# Patient Record
Sex: MALE | Race: WHITE
[De-identification: names, ages, dates, MRNs, and addresses within clinical notes are randomized per-mention and may not be internally consistent; named-entity substitution may affect disease eponyms.]

---

## 2017-11-16 NOTE — OR
DATE OF PROCEDURE:  11/16/2017

 

PROCEDURES:

1. Radiofrequency ablation of left greater saphenous vein.

2. Radiofrequency ablation of right greater saphenous vein.

3. Radiofrequency ablation of right anterior accessory vein.

4. Sclerotherapy of left leg, multiple.

5. Sclerotherapy of right leg, multiple.

6. Compression wrapping, right leg, two-stage (20021).

7. Compression wrapping, left leg, two-stage (76548).

 

COMPLICATIONS:  None.

 

ASSISTANT:  None.

 

ANESTHESIA:  MAC.

 

PREOPERATIVE DIAGNOSIS:  Venous/varicose vein insufficiency with inflammation and pain.

 

POSTOPERATIVE DIAGNOSIS:  Venous/varicose vein insufficiency with inflammation and pain.

 

RISKS:  Risks, benefits, alternatives, and limitations including, but not limited to

infection, bleeding, and DVT formation were explained to the patient, and he wished to

proceed.

 

PROCEDURE IN DETAIL:  The patient was placed in supine position.  The left greater saphenous

vein was accessed around the level of the mid calf using a 21-gauge needle.  This was then

exchanged for a 35,000th wire.  This was then exchanged for a 7-Maltese sheath.

 

The RFA probe was advanced to greater than 3 cm from the saphenofemoral junction.

 

Tumescent fluid was injected in 1 cm jacket around this, and this would be verified a second

and a third time.  Direct even pressure was deployed.  The probe was deployed proximally and

distally x2 and x1 in all other segments.  The sheath and device were then removed.

 

In the same manner, same fashion and same technique, the same sequence was then performed

for the right greater saphenous vein.

 

In the same manner, same fashion and same technique, using the same equipment and same

sequence was then also performed to the right anterior accessory vein, although this was

accessed at around the level of the knee.

 

Sclerotherapy was then performed using 0.33% sodium tetradactyl and was always drawn back to

ensure intravascular injection only.  No more than 2 mL was injected in one location.  Six

on the right and six on the left.  This was always drawn back to ensure intravascular

injection only.

 

Two-stage CoFlex wrapping in figure-of-eight fashion was performed on the left and right

legs.  This was done proximal to distal.  This was to create the proximal and distal

gradient.  The patient tolerated the procedure well.

 

 

 

 

Sarath Mccormack MD

DD:  11/16/2017 10:44:02

DT:  11/16/2017 11:13:00

Job #:  [id number]

## 2019-10-16 ENCOUNTER — HOSPITAL ENCOUNTER (EMERGENCY)
Dept: HOSPITAL 11 - JP.ED | Age: 64
Discharge: HOME | End: 2019-10-16
Payer: MEDICAID

## 2019-10-16 VITALS — SYSTOLIC BLOOD PRESSURE: 144 MMHG | DIASTOLIC BLOOD PRESSURE: 75 MMHG | HEART RATE: 82 BPM
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
## 2019-10-16 DIAGNOSIS — I48.91: ICD-10-CM

## 2019-10-16 DIAGNOSIS — E11.9: ICD-10-CM

## 2019-10-16 DIAGNOSIS — E66.9: ICD-10-CM

## 2019-10-16 DIAGNOSIS — I50.9: ICD-10-CM

## 2019-10-16 DIAGNOSIS — T26.02XA: ICD-10-CM

## 2019-10-16 DIAGNOSIS — T15.12XA: Primary | ICD-10-CM
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
## 2020-09-15 ENCOUNTER — HOSPITAL ENCOUNTER (OUTPATIENT)
Dept: HOSPITAL 11 - JP.SDS | Age: 65
LOS: 1 days | Discharge: HOME | End: 2020-09-16
Attending: SURGERY
Payer: MEDICARE

## 2020-09-15 DIAGNOSIS — I48.0: ICD-10-CM

## 2020-09-15 DIAGNOSIS — I50.22: ICD-10-CM

## 2020-09-15 DIAGNOSIS — I42.9: ICD-10-CM

## 2020-09-15 DIAGNOSIS — M17.0: ICD-10-CM

## 2020-09-15 DIAGNOSIS — G57.81: ICD-10-CM

## 2020-09-15 DIAGNOSIS — E11.22: ICD-10-CM

## 2020-09-15 DIAGNOSIS — M54.5: ICD-10-CM

## 2020-09-15 DIAGNOSIS — I87.2: ICD-10-CM

## 2020-09-15 DIAGNOSIS — N18.9: ICD-10-CM

## 2020-09-15 DIAGNOSIS — I25.10: ICD-10-CM

## 2020-09-15 DIAGNOSIS — K40.30: Primary | ICD-10-CM

## 2020-09-15 DIAGNOSIS — E78.5: ICD-10-CM

## 2020-09-15 DIAGNOSIS — G89.29: ICD-10-CM

## 2020-09-15 PROCEDURE — C1781 MESH (IMPLANTABLE): HCPCS

## 2020-09-15 PROCEDURE — 82962 GLUCOSE BLOOD TEST: CPT

## 2020-09-15 PROCEDURE — 64772 INCISION OF SPINAL NERVE: CPT

## 2020-09-15 PROCEDURE — C1713 ANCHOR/SCREW BN/BN,TIS/BN: HCPCS

## 2020-09-15 PROCEDURE — 49507 PRP I/HERN INIT BLOCK >5 YR: CPT

## 2020-09-15 PROCEDURE — 94762 N-INVAS EAR/PLS OXIMTRY CONT: CPT

## 2020-09-15 RX ADMIN — INSULIN LISPRO PRN UNITS: 100 INJECTION, SOLUTION INTRAVENOUS; SUBCUTANEOUS at 16:52

## 2020-09-15 RX ADMIN — INSULIN LISPRO PRN UNITS: 100 INJECTION, SOLUTION INTRAVENOUS; SUBCUTANEOUS at 22:24

## 2020-09-15 RX ADMIN — INSULIN LISPRO PRN UNITS: 100 INJECTION, SOLUTION INTRAVENOUS; SUBCUTANEOUS at 13:24

## 2020-09-16 VITALS — DIASTOLIC BLOOD PRESSURE: 65 MMHG | SYSTOLIC BLOOD PRESSURE: 125 MMHG | HEART RATE: 74 BPM

## 2020-09-16 NOTE — DISCH
ADMISSION DIAGNOSES:  Right inguinal hernia, paroxysmal atrial fibrillation, cardiomyopathy,

primary arthritis of both knees, controlled type 2 diabetes mellitus with chronic kidney

disease, chronic systolic congestive heart failure, venous insufficiency of both

extremities, coronary artery disease, chronic bilateral low back pain, hyperlipidemia.

 

DISCHARGE DIAGNOSES:

1. Right inguinal exploration:

    a.     Repair of incarcerated indirect right inguinal hernia, and right ilioinguinal

     nerve and ileal hernia nerve at risk for scar entrapment.

    b.     Diversion and excision of portion of ilioinguinal nerve and ileal hernia nerve.

 

POSTOPERATIVE DIAGNOSIS:  Incarcerated indirect right inguinal hernia.  Date of procedure:

09/15/2020.  Surgeon:  Quinn Lewis MD.

 

HISTORY:  Davin Majano is a pleasant 65-year-old male with incarcerated inguinal hernia.

After preoperative evaluation and discussion of possible risks and possible complications,

he wished to proceed with surgical procedure.

 

HOSPITAL COURSE:  Davin had his surgery on 09/15/2020.  He had no operative complications.

On postoperative day #1, he was able to be discharged to home without any complications.

 

PHYSICAL EXAMINATION:

GENERAL:  Davin Majano is a 65-year-old male.

VITAL SIGNS:  Height is 5 feet 11 inches, weight is 224 pounds, BMI is 31.  TPR at 0500, 96;

62; 16; blood pressure 130/67.

HEENT:  Negative.

NECK:  Supple.

HEART:  Regular rate and rhythm.

LUNGS:  Clear.

ABDOMEN:  Negative.  Right inguinal dressing is on and dry and intact.

EXTREMITIES:  Without peripheral edema.

 

DISPOSITION:  Discharged to home.

 

CONDITION:  Stable and improving.

 

FOLLOWUP APPOINTMENT:  Virginia Triana PA-C, on 09/28/2020 at 9:15 a.m.

 

HOME MEDICATIONS:

1. Colace 100 mg oral b.i.d., #60.

2. Dilaudid 2 mg oral q.4 hours p.r.n. pain, #42.

3. Milk of magnesia 30 mL, 2 doses were sent home with the patient, to take 1 daily if

    needed for constipation.

4. Tylenol Extra-Strength 1000 mg every 6 hours p.r.n. pain.

5. He is to resume his home medication and supplements.

 

DIET:  Usual diabetic diet as tolerated.  Drink 8 to 10 glasses of water a day.

 

ACTIVITY:  No lifting over 10 pounds for 6 weeks.  Driving:  Do not drive for 1 week and

while on pain medication.

Shower/bathing:  May shower.

 

DISCHARGE INSTRUCTIONS:  Notify provider if any fever, increased pain, swelling, redness,

drainage, nausea, vomiting.  Keep site clean and dry.  Wear athletic supporter as directed.

Use incentive spirometer 10 times every hour while awake for 1 week.

## 2020-09-20 NOTE — OR
DATE OF PROCEDURE:  09/15/2020

 

SURGEON:  Quinn Lewis MD

 

PREOPERATIVE DIAGNOSIS:  Incarcerated right inguinal hernia.

 

POSTOPERATIVE DIAGNOSES:

1. Incarcerated indirect right inguinal hernia.

2. Right ilioinguinal and iliohypogastric nerves at risk for scar entrapment.

 

OPERATIVE PROCEDURE:  Right inguinal exploration with:

1. Repair of incarcerated right inguinal hernia with mesh plug technique (31196).

2. Excision of a portion of right ilioinguinal nerve (55529).

3. Excision of portion of right iliohypogastric nerve (63650).

 

ANESTHESIA:  General.

 

ASSISTANT:  Virginia Triana PA-C.

 

INDICATIONS FOR PROCEDURE:  A 65-year-old presenting with a large not clearly reducible

inguinal hernia on the right side.  Plan is to proceed with an open inguinal hernia repair

with mesh plug technique.  Given the extent of the hernia __________ will be planned and

with this in addition to his underlying medical comorbidities, he will be kept overnight 1

night. Potential risks of the procedure including bleeding, infection, injury to underlying

viscera, problems with mesh becoming infected or the hernia recurring, chronic pain after

the hernia repair as well as the remote possibility of cardiopulmonary, septic, or

hemorrhagic complications leading to death were all discussed, and the patient wishes to

proceed.

 

DETAILS OF PROCEDURE:  The patient was taken to the operating room.  After general

endotracheal anesthesia was induced, the abdomen and groin areas were prepped and draped.  A

standard right inguinal incision was made and carried down through the skin and subcutaneous

tissue and through the external oblique aponeurosis. Subaponeurotic flaps were then raised

superiorly and inferiorly and the cord structures were then mobilized upward.  The patient

was noted to have a large sac extending from the internal ring downward into the upper

aspect of the scrotum.  This contained some palpable bowel which once the cord structures

were mobilized upward was able to be reduced.  The cremasteric fibers were then divided and

the sac was then dissected back down to the level of the internal ring.  This was a more or

less pure indirect hernia with the floor medially being still substantially intact.  At this

point, the area was assessed and an extra large mesh plug was then placed into the internal

ring.  This was affixed medially to the Leonid's ligament with titanium tacking screws to

the underside of the conjoint tendon medially, superiorly, and then laterally with

horizontal mattress sutures of 0 Vicryl stitch.  Inferiorly, 1 additional stitch was placed

between the mesh and the shelving portion of the inguinal ligament lateral to the cord

structures.  At this point, the repair appeared to be satisfactory.  The conjoint tendon

somewhat lateral to the cord structures in this case was then affixed to the shelving

portion of the inguinal ligament with a 0 Vicryl stitch displacing the cord structures

somewhat medially.

 

Both the ilioinguinal and iliohypogastric nerves were going to be directly under the flat

portion of the mesh plug system and these were then divided in the midportion of the field

and then dissected to the far lateral aspect of the incision where they were once again

divided so as to minimize chances of neuropathic postoperative pain.

 

The flat portion of mesh plug system was then fashioned such that it encircled the cord

structures.  It was affixed medially to the pubic tubercle with titanium tacking screw and

then cut such that there was regular coverage of the lateral aspect of the inguinal floor

outside of the cord structures and sutured with some 3-0 Vicryl stitch.  The external

oblique aponeurosis was then approximated with some 3-0 Vicryl stitch as was Brissa's fascia

and the skin closed with 4-0 Vicryl subcuticular stitch.  Prior to closure, the lesion was

anesthetized with 1% lidocaine mixed with Marcaine and the patient taken to the recovery

room in satisfactory condition.

 

Physician assistant, Virginia Triana, played an essential role in assisting in this case,

helping to position the patient, retract structures as needed, as well as suturing and

cutting sutures when indicated.  Her presence improved patient safety and decreased the

operative time.

 

 

 

 

Quinn Lewis MD

DD:  09/19/2020 17:17:44

DT:  09/20/2020 10:23:54

Job #:  [id number]

## 2025-05-19 ENCOUNTER — HOSPITAL ENCOUNTER (EMERGENCY)
Dept: HOSPITAL 11 - JP.ED | Age: 70
Discharge: HOME | End: 2025-05-19
Payer: MEDICARE

## 2025-05-19 VITALS — HEART RATE: 63 BPM | DIASTOLIC BLOOD PRESSURE: 78 MMHG | SYSTOLIC BLOOD PRESSURE: 183 MMHG

## 2025-05-19 DIAGNOSIS — Z79.4: ICD-10-CM

## 2025-05-19 DIAGNOSIS — I50.9: ICD-10-CM

## 2025-05-19 DIAGNOSIS — J01.00: Primary | ICD-10-CM

## 2025-05-19 DIAGNOSIS — I48.91: ICD-10-CM

## 2025-05-19 DIAGNOSIS — I13.0: ICD-10-CM

## 2025-05-19 DIAGNOSIS — E78.00: ICD-10-CM

## 2025-05-19 DIAGNOSIS — N18.9: ICD-10-CM

## 2025-05-19 DIAGNOSIS — I25.10: ICD-10-CM

## 2025-05-19 DIAGNOSIS — E11.22: ICD-10-CM

## 2025-05-19 DIAGNOSIS — E66.9: ICD-10-CM

## 2025-05-19 DIAGNOSIS — Z79.84: ICD-10-CM

## 2025-05-19 DIAGNOSIS — Z79.899: ICD-10-CM

## 2025-05-19 PROCEDURE — 96372 THER/PROPH/DIAG INJ SC/IM: CPT

## 2025-05-19 PROCEDURE — 99284 EMERGENCY DEPT VISIT MOD MDM: CPT

## 2025-05-19 PROCEDURE — 70450 CT HEAD/BRAIN W/O DYE: CPT

## 2025-05-19 RX ADMIN — SUMATRIPTAN ONE MG: 6 INJECTION, SOLUTION SUBCUTANEOUS at 12:20

## 2025-06-12 ENCOUNTER — HOSPITAL ENCOUNTER (INPATIENT)
Dept: HOSPITAL 11 - JP.ED | Age: 70
LOS: 2 days | Discharge: HOME HEALTH | DRG: 637 | End: 2025-06-14
Attending: INTERNAL MEDICINE | Admitting: INTERNAL MEDICINE
Payer: MEDICARE

## 2025-06-12 DIAGNOSIS — N18.9: ICD-10-CM

## 2025-06-12 DIAGNOSIS — E66.9: ICD-10-CM

## 2025-06-12 DIAGNOSIS — I25.10: ICD-10-CM

## 2025-06-12 DIAGNOSIS — E11.22: ICD-10-CM

## 2025-06-12 DIAGNOSIS — Z79.4: ICD-10-CM

## 2025-06-12 DIAGNOSIS — E11.10: ICD-10-CM

## 2025-06-12 DIAGNOSIS — E11.11: Primary | ICD-10-CM

## 2025-06-12 DIAGNOSIS — I42.9: ICD-10-CM

## 2025-06-12 DIAGNOSIS — G93.41: ICD-10-CM

## 2025-06-12 DIAGNOSIS — I13.0: ICD-10-CM

## 2025-06-12 DIAGNOSIS — H54.7: ICD-10-CM

## 2025-06-12 DIAGNOSIS — Z79.899: ICD-10-CM

## 2025-06-12 DIAGNOSIS — I48.91: ICD-10-CM

## 2025-06-12 DIAGNOSIS — Z90.49: ICD-10-CM

## 2025-06-12 DIAGNOSIS — N17.9: ICD-10-CM

## 2025-06-12 DIAGNOSIS — Z87.81: ICD-10-CM

## 2025-06-12 DIAGNOSIS — Z79.84: ICD-10-CM

## 2025-06-12 DIAGNOSIS — M19.90: ICD-10-CM

## 2025-06-12 DIAGNOSIS — G43.909: ICD-10-CM

## 2025-06-12 DIAGNOSIS — I50.9: ICD-10-CM

## 2025-06-12 DIAGNOSIS — Z98.890: ICD-10-CM

## 2025-06-12 DIAGNOSIS — E78.00: ICD-10-CM

## 2025-06-12 LAB
ALBUMIN SERPL-MCNC: 2.6 G/DL (ref 3.4–5)
ALBUMIN/GLOB SERPL: 0.8 {RATIO} (ref 1.2–2.2)
ALP SERPL-CCNC: 105 U/L (ref 46–116)
ALT SERPL-CCNC: 23 U/L (ref 12–78)
AMORPH SED URNS QL MICRO: (no result)
AMPHET UR QL SCN: NEGATIVE
AMPHET UR QL SCN: NEGATIVE
ANION GAP SERPL CALC-SCNC: 12.1 MMOL/L (ref 5–14)
ANION GAP SERPL CALC-SCNC: 24.9 MMOL/L (ref 5–14)
ANION GAP SERPL CALC-SCNC: 33.4 MMOL/L (ref 5–14)
ANION GAP SERPL CALC-SCNC: 39.2 MMOL/L (ref 5–14)
ANION GAP SERPL CALC-SCNC: 9.3 MMOL/L (ref 5–14)
ANISOCYTOSIS BLD QL SMEAR: (no result)
APPEARANCE UR: (no result)
AST SERPL-CCNC: 18 U/L (ref 15–37)
BACTERIA URNS QL MICRO: (no result)
BARBITURATES UR QL SCN: NEGATIVE
BASE EXCESS BLDA CALC-SCNC: -25.8 MM/L
BASOPHILS # BLD AUTO: (no result) K/UL (ref 0–0.1)
BASOPHILS # BLD: (no result) K/UL (ref 0–0.1)
BASOPHILS NFR BLD AUTO: (no result) % (ref 0.1–1.3)
BASOPHILS NFR BLD MANUAL: (no result) % (ref 0–1)
BENZODIAZ UR QL SCN: NEGATIVE
BILIRUB SERPL-MCNC: 0.7 MG/DL (ref 0.2–1)
BILIRUB UR STRIP-MCNC: (no result) MG/DL
BLASTS # BLD MANUAL: (no result) K/UL (ref 0–0)
BLASTS NFR BLD MANUAL: (no result) %
BUN SERPL-MCNC: 39 MG/DL (ref 7–18)
BUN SERPL-MCNC: 42 MG/DL (ref 7–18)
BUN SERPL-MCNC: 43 MG/DL (ref 7–18)
BUN SERPL-MCNC: 43 MG/DL (ref 7–18)
BUN SERPL-MCNC: 44 MG/DL (ref 7–18)
BUN/CREAT SERPL: (no result)
BURR CELLS BLD QL SMEAR: (no result)
CALCIUM SERPL-MCNC: 8.9 MG/DL (ref 8.5–10.1)
CALCIUM SERPL-MCNC: 8.9 MG/DL (ref 8.5–10.1)
CALCIUM SERPL-MCNC: 9 MG/DL (ref 8.5–10.1)
CALCIUM SERPL-MCNC: 9 MG/DL (ref 8.5–10.1)
CALCIUM SERPL-MCNC: 9.5 MG/DL (ref 8.5–10.1)
CHLORIDE SERPL-SCNC: 100 MMOL/L (ref 100–108)
CHLORIDE SERPL-SCNC: 104 MMOL/L (ref 100–108)
CHLORIDE SERPL-SCNC: 107 MMOL/L (ref 100–108)
CHLORIDE SERPL-SCNC: 108 MMOL/L (ref 100–108)
CHLORIDE SERPL-SCNC: 92 MMOL/L (ref 100–108)
CO2 SERPL-SCNC: 14 MMOL/L (ref 21–32)
CO2 SERPL-SCNC: 22 MMOL/L (ref 21–32)
CO2 SERPL-SCNC: 25 MMOL/L (ref 21–32)
CO2 SERPL-SCNC: 7 MMOL/L (ref 21–32)
CO2 SERPL-SCNC: 9 MMOL/L (ref 21–32)
COHGB MFR BLDA: 1.5 % (ref 0–1.6)
COLOR UR: YELLOW
CREAT CL 24H UR+SERPL-VRATE: (no result) ML/MIN
CREAT CL 24H UR+SERPL-VRATE: 25.71 ML/MIN
CREAT CL 24H UR+SERPL-VRATE: 25.71 ML/MIN
CREAT CL 24H UR+SERPL-VRATE: 26.66 ML/MIN
CREAT CL 24H UR+SERPL-VRATE: 28.79 ML/MIN
CREAT SERPL-MCNC: 2.5 MG/DL (ref 0.8–1.3)
CREAT SERPL-MCNC: 2.7 MG/DL (ref 0.8–1.3)
CREAT SERPL-MCNC: 2.8 MG/DL (ref 0.8–1.3)
DACRYOCYTES BLD QL SMEAR: (no result)
EOSINOPHIL # BLD AUTO: (no result) K/UL (ref 0–0.4)
EOSINOPHIL # BLD: (no result) K/UL (ref 0–0.4)
EOSINOPHIL NFR BLD AUTO: (no result) % (ref 0–5.4)
EOSINOPHIL NFR BLD MANUAL: (no result) % (ref 2–4)
EPI CELLS #/AREA URNS HPF: (no result) /[HPF]
GLOBULIN SER-MCNC: 3.2 G/DL (ref 2.3–3.5)
GLUCOSE SERPL-MCNC: 143 MG/DL (ref 74–106)
GLUCOSE SERPL-MCNC: 147 MG/DL (ref 74–106)
GLUCOSE SERPL-MCNC: 244 MG/DL (ref 74–106)
GLUCOSE SERPL-MCNC: 404 MG/DL (ref 74–106)
GLUCOSE SERPL-MCNC: 735 MG/DL (ref 74–106)
GLUCOSE UR STRIP-MCNC: 500 MG/DL
HCO3 BLDA-SCNC: 3.2 MMOL/L (ref 22–26)
HCT VFR BLD AUTO: 37 % (ref 38.4–49.7)
HELMET CELLS BLD QL SMEAR: (no result)
HGB BLD-MCNC: 11.7 G/DL (ref 12.9–16.9)
HGB BLDA-MCNC: 12 G/DL (ref 13.5–18)
HYPOCHROMIA BLD QL SMEAR: (no result)
IMM GRANULOCYTES # BLD: (no result) K/UL (ref 0–0.23)
IMM GRANULOCYTES NFR BLD: (no result) % (ref 0–0.7)
KETONES UR STRIP-MCNC: 80 MG/DL
LEUKOCYTE ESTERASE UR QL STRIP: NEGATIVE
LYMPHOCYTES # BLD AUTO: (no result) K/UL (ref 0.8–3.3)
LYMPHOCYTES # BLD: 1.02 K/UL (ref 0.8–3.3)
LYMPHOCYTES NFR BLD AUTO: (no result) % (ref 11.4–47.7)
LYMPHOCYTES NFR BLD MANUAL: 6 % (ref 24–44)
MACROCYTES BLD QL SMEAR: (no result)
MAGNESIUM SERPL-MCNC: 2.3 MG/DL (ref 1.8–2.4)
MANUAL DIF COMMENT BLD-IMP: (no result)
MCH RBC QN AUTO: 31.5 PG (ref 31.6–35.5)
MCHC RBC AUTO-ENTMCNC: 31.6 G/DL (ref 31.6–35.5)
MCHC RBC AUTO-ENTMCNC: 99.7 FL (ref 81.4–99)
METAMYELOCYTES # BLD MANUAL: (no result) K/UL
METAMYELOCYTES NFR BLD MANUAL: (no result) %
METHADONE UR QL SCN: NEGATIVE
METHGB MFR BLDA: 1.8 %
MICROCYTES BLD QL SMEAR: (no result)
MONOCYTES # BLD AUTO: (no result) K/UL (ref 0.2–0.9)
MONOCYTES # BLD MANUAL: 1.02 K/UL (ref 0.2–0.9)
MONOCYTES NFR BLD AUTO: (no result) % (ref 3.3–12.6)
MONOCYTES NFR BLD MANUAL: 6 % (ref 2–6)
MUCOUS THREADS URNS QL MICRO: (no result)
MYELOCYTES # BLD MANUAL: (no result) 10*3/UL
MYELOCYTES NFR BLD MANUAL: (no result) %
NEUTROPHILS # BLD AUTO: (no result) K/UL (ref 1–7.6)
NEUTROPHILS # BLD: 13.09 K/UL (ref 1–7.6)
NEUTROPHILS NFR BLD AUTO: (no result) % (ref 40–78.1)
NEUTROPHILS NFR BLD MANUAL: 77 % (ref 36–66)
NEUTS BAND # BLD MANUAL: 1.87 K/UL
NEUTS BAND NFR BLD MANUAL: 11 % (ref 5–11)
NEUTS HYPERSEG BLD QL SMEAR: (no result)
NITRITE UR QL: NEGATIVE
NRBC BLD MANUAL-RTO: (no result) %
OTHER ELEMENTS URNS MICRO: (no result)
OVALOCYTES BLD QL SMEAR: (no result)
OXYCODONE UR QL SCN: NEGATIVE
OXYHGB MFR BLDA: 94.5 %
PCO2 BLDA: 10.3 MMHG (ref 35–42)
PELGER HUET CELLS BLD QL SMEAR: (no result)
PH UR STRIP: 5.5 [PH] (ref 5–8)
PLASMA CELLS NFR BLD: (no result) %
PLATELET # BLD AUTO: 274 K/UL (ref 130–375)
PO2 BLDA: 136 MMHG (ref 75–100)
POIKILOCYTOSIS BLD QL SMEAR: (no result)
POLYCHROMASIA BLD QL SMEAR: (no result)
POTASSIUM SERPL-SCNC: 3.7 MMOL/L (ref 3.6–5.2)
POTASSIUM SERPL-SCNC: 3.8 MMOL/L (ref 3.6–5.2)
POTASSIUM SERPL-SCNC: 3.9 MMOL/L (ref 3.6–5.2)
POTASSIUM SERPL-SCNC: 4.4 MMOL/L (ref 3.6–5.2)
POTASSIUM SERPL-SCNC: 5.2 MMOL/L (ref 3.6–5.2)
PROMYELOCYTES # BLD MANUAL: (no result) K/UL
PROMYELOCYTES NFR BLD MANUAL: (no result) %
PROPOXYPH UR QL SCN: NEGATIVE
PROT SERPL-MCNC: 5.8 G/DL (ref 6.4–8.2)
PROT UR STRIP-MCNC: >=300 MG/DL
RBC # BLD AUTO: 3.71 M/UL (ref 4.14–5.76)
RBC # URNS HPF: (no result) /ML (ref 0–5)
RBC UR QL: (no result)
ROULEAUX BLD QL SMEAR: (no result)
SAO2 % BLDA: 97.7 % (ref 95–98)
SCHISTOCYTES BLD QL SMEAR: (no result)
SICKLE CELLS BLD QL SMEAR: (no result)
SMUDGE CELLS BLD QL SMEAR: (no result)
SODIUM SERPL-SCNC: 133 MMOL/L (ref 140–148)
SODIUM SERPL-SCNC: 138 MMOL/L (ref 140–148)
SODIUM SERPL-SCNC: 139 MMOL/L (ref 140–148)
SODIUM SERPL-SCNC: 141 MMOL/L (ref 140–148)
SODIUM SERPL-SCNC: 142 MMOL/L (ref 140–148)
SP GR UR STRIP: 1.02 (ref 1.01–1.03)
SPHEROCYTES BLD QL SMEAR: (no result)
STOMATOCYTES BLD QL SMEAR: (no result)
TARGETS BLD QL SMEAR: (no result)
THC UR QL SCN>50 NG/ML: NEGATIVE
UROBILINOGEN UR STRIP-ACNC: 0.2 EU/DL (ref 0.2–1)
VARIANT LYMPHS BLD QL SMEAR: (no result)
VARIANT LYMPHS NFR BLD MANUAL: (no result) %
WBC # BLD AUTO: 17 K/UL (ref 3.2–11)
WBC UR QL: (no result) (ref 0–5)

## 2025-06-12 RX ADMIN — MIDAZOLAM HYDROCHLORIDE ONE MG: 1 INJECTION, SOLUTION INTRAMUSCULAR; INTRAVENOUS at 05:43

## 2025-06-12 RX ADMIN — SODIUM CHLORIDE SCH MG: 9 INJECTION, SOLUTION INTRAVENOUS at 08:42

## 2025-06-12 RX ADMIN — INSULIN HUMAN SCH MLS/HR: 1 INJECTION, SOLUTION INTRAVENOUS at 05:21

## 2025-06-12 RX ADMIN — HALOPERIDOL LACTATE ONE: 5 INJECTION, SOLUTION INTRAMUSCULAR at 09:27

## 2025-06-12 RX ADMIN — SODIUM CHLORIDE SCH MLS/HR: 0.9 INJECTION, SOLUTION INTRAVENOUS at 04:28

## 2025-06-12 RX ADMIN — SODIUM BICARBONATE ONE: 84 INJECTION, SOLUTION INTRAVENOUS at 04:57

## 2025-06-12 RX ADMIN — MORPHINE SULFATE PRN MG: 2 INJECTION, SOLUTION INTRAMUSCULAR; INTRAVENOUS at 07:00

## 2025-06-12 RX ADMIN — DIPHENHYDRAMINE HYDROCHLORIDE ONE: 50 INJECTION, SOLUTION INTRAMUSCULAR; INTRAVENOUS at 09:27

## 2025-06-12 RX ADMIN — SODIUM BICARBONATE ONE MEQ: 84 INJECTION, SOLUTION INTRAVENOUS at 04:48

## 2025-06-12 RX ADMIN — INSULIN HUMAN SCH MLS/HR: 1 INJECTION, SOLUTION INTRAVENOUS at 08:15

## 2025-06-12 RX ADMIN — HALOPERIDOL LACTATE ONE MG: 5 INJECTION, SOLUTION INTRAMUSCULAR at 04:28

## 2025-06-12 RX ADMIN — DIPHENHYDRAMINE HYDROCHLORIDE ONE MG: 50 INJECTION, SOLUTION INTRAMUSCULAR; INTRAVENOUS at 04:28

## 2025-06-12 RX ADMIN — DEXTROSE AND SODIUM CHLORIDE SCH MLS/HR: 5; .9 INJECTION, SOLUTION INTRAVENOUS at 17:31

## 2025-06-12 RX ADMIN — SODIUM CHLORIDE SCH MLS/HR: 0.9 INJECTION, SOLUTION INTRAVENOUS at 08:17

## 2025-06-13 LAB
ANION GAP SERPL CALC-SCNC: 10.7 MMOL/L (ref 5–14)
ANION GAP SERPL CALC-SCNC: 12.5 MMOL/L (ref 5–14)
BUN SERPL-MCNC: 33 MG/DL (ref 7–18)
BUN SERPL-MCNC: 36 MG/DL (ref 7–18)
CALCIUM SERPL-MCNC: 8.6 MG/DL (ref 8.5–10.1)
CALCIUM SERPL-MCNC: 8.9 MG/DL (ref 8.5–10.1)
CHLORIDE SERPL-SCNC: 110 MMOL/L (ref 100–108)
CHLORIDE SERPL-SCNC: 111 MMOL/L (ref 100–108)
CO2 SERPL-SCNC: 24 MMOL/L (ref 21–32)
CO2 SERPL-SCNC: 25 MMOL/L (ref 21–32)
CREAT CL 24H UR+SERPL-VRATE: 32.72 ML/MIN
CREAT CL 24H UR+SERPL-VRATE: 35.99 ML/MIN
CREAT SERPL-MCNC: 2 MG/DL (ref 0.8–1.3)
CREAT SERPL-MCNC: 2.2 MG/DL (ref 0.8–1.3)
GLUCOSE SERPL-MCNC: 142 MG/DL (ref 74–106)
GLUCOSE SERPL-MCNC: 156 MG/DL (ref 74–106)
HCT VFR BLD AUTO: 30 % (ref 38.4–49.7)
HGB BLD-MCNC: 10.7 G/DL (ref 12.9–16.9)
MCH RBC QN AUTO: 31.8 PG (ref 31.6–35.5)
MCHC RBC AUTO-ENTMCNC: 35.7 G/DL (ref 31.6–35.5)
MCHC RBC AUTO-ENTMCNC: 89.3 FL (ref 81.4–99)
PLATELET # BLD AUTO: 166 K/UL (ref 130–375)
POTASSIUM SERPL-SCNC: 3.5 MMOL/L (ref 3.6–5.2)
POTASSIUM SERPL-SCNC: 3.7 MMOL/L (ref 3.6–5.2)
RBC # BLD AUTO: 3.36 M/UL (ref 4.14–5.76)
SODIUM SERPL-SCNC: 143 MMOL/L (ref 140–148)
SODIUM SERPL-SCNC: 143 MMOL/L (ref 140–148)
WBC # BLD AUTO: 11 K/UL (ref 3.2–11)

## 2025-06-13 RX ADMIN — INSULIN LISPRO SCH UNIT: 100 INJECTION, SOLUTION INTRAVENOUS; SUBCUTANEOUS at 11:28

## 2025-06-14 VITALS — HEART RATE: 70 BPM | SYSTOLIC BLOOD PRESSURE: 166 MMHG | DIASTOLIC BLOOD PRESSURE: 75 MMHG

## 2025-06-14 RX ADMIN — INSULIN GLARGINE SCH UNITS: 100 INJECTION, SOLUTION SUBCUTANEOUS at 09:33

## 2025-06-14 RX ADMIN — ACETAMINOPHEN PRN MG: 325 TABLET, FILM COATED ORAL at 05:36
